# Patient Record
Sex: FEMALE | Employment: FULL TIME | URBAN - METROPOLITAN AREA
[De-identification: names, ages, dates, MRNs, and addresses within clinical notes are randomized per-mention and may not be internally consistent; named-entity substitution may affect disease eponyms.]

---

## 2020-11-20 ENCOUNTER — HOSPITAL ENCOUNTER (OUTPATIENT)
Dept: LAB | Age: 60
Discharge: HOME | End: 2020-11-20

## 2020-11-20 PROCEDURE — 88305 TISSUE EXAM BY PATHOLOGIST: CPT

## 2020-11-20 PROCEDURE — 88312 SPECIAL STAINS GROUP 1: CPT

## 2021-06-11 ENCOUNTER — HOSPITAL ENCOUNTER (OUTPATIENT)
Dept: LAB | Age: 61
Discharge: HOME | End: 2021-06-11
Payer: COMMERCIAL

## 2021-06-11 DIAGNOSIS — R53.82 CHRONIC FATIGUE: ICD-10-CM

## 2021-06-11 DIAGNOSIS — R94.31 EKG, ABNORMAL: ICD-10-CM

## 2021-06-11 DIAGNOSIS — R06.02 SOB (SHORTNESS OF BREATH): ICD-10-CM

## 2021-06-11 LAB
25(OH)D3 SERPL-MCNC: 16.1 NG/ML (ref 30–100)
ALBUMIN SERPL-MCNC: 3.9 G/DL (ref 3.2–4.6)
ALBUMIN/GLOB SERPL: 1.1 {RATIO} (ref 1.2–3.5)
ALP SERPL-CCNC: 68 U/L (ref 50–136)
ALT SERPL-CCNC: 21 U/L (ref 12–65)
ANION GAP SERPL CALC-SCNC: 6 MMOL/L (ref 7–16)
AST SERPL-CCNC: 17 U/L (ref 15–37)
BASOPHILS # BLD: 0.1 K/UL (ref 0–0.2)
BASOPHILS NFR BLD: 1 % (ref 0–2)
BILIRUB SERPL-MCNC: 0.5 MG/DL (ref 0.2–1.1)
BNP SERPL-MCNC: 93 PG/ML (ref 5–125)
BUN SERPL-MCNC: 8 MG/DL (ref 8–23)
CALCIUM SERPL-MCNC: 9 MG/DL (ref 8.3–10.4)
CHLORIDE SERPL-SCNC: 109 MMOL/L (ref 98–107)
CHOLEST SERPL-MCNC: 284 MG/DL
CO2 SERPL-SCNC: 25 MMOL/L (ref 21–32)
CREAT SERPL-MCNC: 0.64 MG/DL (ref 0.6–1)
DIFFERENTIAL METHOD BLD: NORMAL
EOSINOPHIL # BLD: 0.1 K/UL (ref 0–0.8)
EOSINOPHIL NFR BLD: 1 % (ref 0.5–7.8)
ERYTHROCYTE [DISTWIDTH] IN BLOOD BY AUTOMATED COUNT: 12.4 % (ref 11.9–14.6)
GLOBULIN SER CALC-MCNC: 3.6 G/DL (ref 2.3–3.5)
GLUCOSE SERPL-MCNC: 92 MG/DL (ref 65–100)
HCT VFR BLD AUTO: 39.7 % (ref 35.8–46.3)
HDLC SERPL-MCNC: 87 MG/DL (ref 40–60)
HDLC SERPL: 3.3 {RATIO}
HGB BLD-MCNC: 13.4 G/DL (ref 11.7–15.4)
IMM GRANULOCYTES # BLD AUTO: 0 K/UL (ref 0–0.5)
IMM GRANULOCYTES NFR BLD AUTO: 1 % (ref 0–5)
LDLC SERPL CALC-MCNC: 167.2 MG/DL
LYMPHOCYTES # BLD: 2.2 K/UL (ref 0.5–4.6)
LYMPHOCYTES NFR BLD: 37 % (ref 13–44)
MAGNESIUM SERPL-MCNC: 2.3 MG/DL (ref 1.8–2.4)
MCH RBC QN AUTO: 30.8 PG (ref 26.1–32.9)
MCHC RBC AUTO-ENTMCNC: 33.8 G/DL (ref 31.4–35)
MCV RBC AUTO: 91.3 FL (ref 79.6–97.8)
MONOCYTES # BLD: 0.5 K/UL (ref 0.1–1.3)
MONOCYTES NFR BLD: 9 % (ref 4–12)
NEUTS SEG # BLD: 3 K/UL (ref 1.7–8.2)
NEUTS SEG NFR BLD: 51 % (ref 43–78)
NRBC # BLD: 0 K/UL (ref 0–0.2)
PLATELET # BLD AUTO: 236 K/UL (ref 150–450)
PMV BLD AUTO: 9.9 FL (ref 9.4–12.3)
POTASSIUM SERPL-SCNC: 4 MMOL/L (ref 3.5–5.1)
PROT SERPL-MCNC: 7.5 G/DL (ref 6.3–8.2)
RBC # BLD AUTO: 4.35 M/UL (ref 4.05–5.2)
SODIUM SERPL-SCNC: 140 MMOL/L (ref 136–145)
TRIGL SERPL-MCNC: 149 MG/DL (ref 35–150)
TSH SERPL DL<=0.005 MIU/L-ACNC: 1.88 UIU/ML (ref 0.36–3.74)
VLDLC SERPL CALC-MCNC: 29.8 MG/DL (ref 6–23)
WBC # BLD AUTO: 5.8 K/UL (ref 4.3–11.1)

## 2021-06-11 PROCEDURE — 85025 COMPLETE CBC W/AUTO DIFF WBC: CPT

## 2021-06-11 PROCEDURE — 80053 COMPREHEN METABOLIC PANEL: CPT

## 2021-06-11 PROCEDURE — 84443 ASSAY THYROID STIM HORMONE: CPT

## 2021-06-11 PROCEDURE — 83880 ASSAY OF NATRIURETIC PEPTIDE: CPT

## 2021-06-11 PROCEDURE — 36415 COLL VENOUS BLD VENIPUNCTURE: CPT

## 2021-06-11 PROCEDURE — 83735 ASSAY OF MAGNESIUM: CPT

## 2021-06-11 PROCEDURE — 80061 LIPID PANEL: CPT

## 2021-06-11 PROCEDURE — 82306 VITAMIN D 25 HYDROXY: CPT

## 2021-06-14 NOTE — PROGRESS NOTES
Please call her,   Vit D low. Needs the once weekly high dose Vit D 50k per week for 8 weeks and then 2000 per day of the OTC Vit D3. Please let her know. Try to get 20 min sun exposure per day as well. Thanks!

## 2021-06-17 ENCOUNTER — HOSPITAL ENCOUNTER (OUTPATIENT)
Dept: CT IMAGING | Age: 61
Discharge: HOME | End: 2021-06-17
Attending: INTERNAL MEDICINE

## 2021-06-17 DIAGNOSIS — R53.82 CHRONIC FATIGUE: ICD-10-CM

## 2021-06-17 DIAGNOSIS — R94.31 EKG, ABNORMAL: ICD-10-CM

## 2021-06-17 DIAGNOSIS — R06.02 SOB (SHORTNESS OF BREATH): ICD-10-CM

## 2021-12-02 PROBLEM — E04.2 MULTINODULAR GOITER: Status: ACTIVE | Noted: 2021-12-02

## 2021-12-02 PROBLEM — E66.9 CLASS 1 OBESITY WITH SERIOUS COMORBIDITY AND BODY MASS INDEX (BMI) OF 32.0 TO 32.9 IN ADULT: Status: ACTIVE | Noted: 2021-12-02

## 2022-03-18 PROBLEM — R06.02 SOB (SHORTNESS OF BREATH): Status: ACTIVE | Noted: 2021-06-11

## 2022-03-19 PROBLEM — E66.9 CLASS 1 OBESITY WITH SERIOUS COMORBIDITY AND BODY MASS INDEX (BMI) OF 32.0 TO 32.9 IN ADULT: Status: ACTIVE | Noted: 2021-12-02

## 2022-03-19 PROBLEM — E04.2 MULTINODULAR GOITER: Status: ACTIVE | Noted: 2021-12-02

## 2022-03-19 PROBLEM — R53.82 CHRONIC FATIGUE: Status: ACTIVE | Noted: 2021-06-11

## 2022-03-19 PROBLEM — E66.811 CLASS 1 OBESITY WITH SERIOUS COMORBIDITY AND BODY MASS INDEX (BMI) OF 32.0 TO 32.9 IN ADULT: Status: ACTIVE | Noted: 2021-12-02

## 2022-03-19 PROBLEM — R94.31 EKG, ABNORMAL: Status: ACTIVE | Noted: 2021-06-11

## 2022-06-06 ENCOUNTER — OFFICE VISIT (OUTPATIENT)
Dept: ENDOCRINOLOGY | Age: 62
End: 2022-06-06
Payer: COMMERCIAL

## 2022-06-06 VITALS
SYSTOLIC BLOOD PRESSURE: 124 MMHG | HEIGHT: 63 IN | BODY MASS INDEX: 29.09 KG/M2 | HEART RATE: 77 BPM | WEIGHT: 164.2 LBS | TEMPERATURE: 98.4 F | OXYGEN SATURATION: 100 % | DIASTOLIC BLOOD PRESSURE: 78 MMHG

## 2022-06-06 DIAGNOSIS — E04.2 MULTINODULAR GOITER: Primary | ICD-10-CM

## 2022-06-06 PROCEDURE — 99213 OFFICE O/P EST LOW 20 MIN: CPT | Performed by: INTERNAL MEDICINE

## 2022-06-06 PROCEDURE — 76536 US EXAM OF HEAD AND NECK: CPT | Performed by: INTERNAL MEDICINE

## 2022-06-06 ASSESSMENT — ENCOUNTER SYMPTOMS
DIARRHEA: 0
CONSTIPATION: 0

## 2022-06-06 NOTE — PROGRESS NOTES
Zackery Olvera MD, [facility]  [address], [id number] [facility], [address] [place] [address]  Phone [phone]  Facsimile [phone]          Javier Pina is a 57 y.o. female seen 6/6/2022 for follow up of multinodular goiter        ASSESSMENT AND PLAN:    1. Multinodular goiter  Thyroid ultrasound performed today revealed that the nodules were stable in size. None of the nodules currently meet ACR TI-RADS criteria for FNA biopsy. I will have her return in 1 year for a follow-up ultrasound to document stability. She has no compressive symptoms at this point which would warrant referral for thyroidectomy.                 Procedures:    Thyroid ultrasound 6/6/2022: Right lobe 1.25 x 1.52 x 4.34 cm, homogeneous echotexture. In the mid right lobe medially there is a hypoechoic nodule 0.22 x 0.28 x 0.31 cm. In the mid to inferior right lobe there is a mostly solid isoechoic nodule measuring 0.76 x 0.85 x 1.39 cm without microcalcifications (TR 3). Isthmus measures 0.23 cm. Left lobe 1.31 x 1.69 x 4.62 cm, homogeneous echotexture. In the mid to superior left lobe laterally there is a complex, mostly solid isoechoic nodule measuring 0.37 x 0.40 x 0.47 cm (TR 3). In the mid to inferior left lobe anteriorly-medially there is a complex, mostly solid isoechoic nodule measuring 0.53 x 0.74 x 1.07 cm (TR 3). Just inferiorly there is a complex, mostly solid isoechoic nodule measuring 0.47 x 0.60 x 0.80 cm (TR 3). In the inferior left lobe posteriorly there is a solid hypoechoic nodule measuring 0.70 x 0.81 x 1.18 cm without microcalcifications (TR 4). Just inferiorly is a complex nodule measuring 0.24 x 0.47 x 0.54 cm (TR 2). Follow-up and Dispositions    · Return in about 1 year (around 6/6/2023).          HISTORY OF PRESENT ILLNESS:    THYROID NODULE / MULTINODULAR GOITER     Presentation: Thyromegaly noted on physical examination by primary care physician.     Thyroid Cancer Risk Factors: There is no family history of thyroid cancer. There is no history of radiation to the head/neck.      Symptoms:  Denies anterior neck enlargement/pain/pressure. Denies dysphagia, positional shortness of breath, hoarseness.     Imaging:  Thyroid ultrasound 11/24/2021 [facility]): Right lobe 1.5 x 4.6 x 1.5 cm. In the inferior right lobe there is a solid hypoechoic nodule measuring 1.5 x 0.6 x 1.1 cm (TR 4). Left lobe 1.8 x 5.4 x 1.3 cm. In the inferior left lobe there is a mixed cystic and solid nodule measuring 1.0 x 0.5 x 0.8 cm with subtle hypoechoic component (TR 3). There is a solid hypoechoic nodule in the inferior left lobe measuring 1.2 x 0.7 x 1.2 cm (TR 4). Limited thyroid ultrasound 12/2/2021: In the mid to inferior right lobe there is a complex, predominantly solid isoechoic nodule measuring 0.78 x 0.94 x 1.43 cm without microcalcifications (TR 3). In the mid to inferior left lobe there is a complex predominantly solid isoechoic nodule measuring 0.58 x 0.78 x 0.98 cm (TR 3). In the inferior left lobe laterally there is a complex isoechoic nodule measuring 0.50 x 0.65 x 0.80 cm (TR 2). Just superiorly is a complex, predominantly cystic nodule measuring 0.38 x 0.48 cm (TR 1). In the inferior left lobe posteriorly there is a predominantly solid hypoechoic nodule measuring 0.72 x 0.93 x 1.25 cm without microcalcifications (TR 4). Just inferiorly is a complex nodule measuring 0.27 x 0.45 cm (TR 2). Thyroid ultrasound 6/6/2022: Right lobe 1.25 x 1.52 x 4.34 cm, homogeneous echotexture. In the mid right lobe medially there is a hypoechoic nodule 0.22 x 0.28 x 0.31 cm. In the mid to inferior right lobe there is a mostly solid isoechoic nodule measuring 0.76 x 0.85 x 1.39 cm without microcalcifications (TR 3). Isthmus measures 0.23 cm. Left lobe 1.31 x 1.69 x 4.62 cm, homogeneous echotexture.   In the mid to superior left lobe laterally there is a complex, mostly solid isoechoic nodule measuring 0.37 x 0.40 x 0.47 cm (TR 3). In the mid to inferior left lobe anteriorly-medially there is a complex, mostly solid isoechoic nodule measuring 0.53 x 0.74 x 1.07 cm (TR 3). Just inferiorly there is a complex, mostly solid isoechoic nodule measuring 0.47 x 0.60 x 0.80 cm (TR 3). In the inferior left lobe posteriorly there is a solid hypoechoic nodule measuring 0.70 x 0.81 x 1.18 cm without microcalcifications (TR 4). Just inferiorly is a complex nodule measuring 0.24 x 0.47 x 0.54 cm (TR 2).     Labs:  11/5/2021: TSH 1.29. Review of Systems   Constitutional: Negative for diaphoresis and fatigue. Weight decreased 21 pounds since last visit. Cardiovascular: Negative for palpitations. Gastrointestinal: Negative for constipation and diarrhea. Endocrine: Negative for cold intolerance and heat intolerance. Neurological: Negative for tremors. Vital Signs:  /78   Pulse 77   Temp 98.4 °F (36.9 °C) (Tympanic)   Ht 5' 3\" (1.6 m)   Wt 164 lb 3.2 oz (74.5 kg)   SpO2 100%   BMI 29.09 kg/m²     Wt Readings from Last 3 Encounters:   06/06/22 164 lb 3.2 oz (74.5 kg)   12/02/21 185 lb (83.9 kg)   07/13/21 187 lb 9.6 oz (85.1 kg)       Physical Exam  Constitutional:       General: She is not in acute distress. Neck:      Comments: Thyroid palpable and nodular. Cardiovascular:      Rate and Rhythm: Normal rate and regular rhythm. Lymphadenopathy:      Cervical: No cervical adenopathy. Neurological:      Motor: No tremor.          Orders Placed This Encounter   Procedures    US, HEAD/NECK TISSUES,REAL TIME       Current Outpatient Medications   Medication Sig Dispense Refill    ergocalciferol (ERGOCALCIFEROL) 1.25 MG (05123 UT) capsule Take 50,000 Units by mouth every 7 days      omeprazole (PRILOSEC) 40 MG delayed release capsule TAKE ONE CAPSULE 30 MINUTES BEFORE MORNING MEAL TWO TIMES A DAY FOR 30 DAYS       No current facility-administered medications for this visit.

## 2023-07-18 ENCOUNTER — TRANSCRIBE ORDERS (OUTPATIENT)
Dept: SCHEDULING | Age: 63
End: 2023-07-18

## 2023-07-18 DIAGNOSIS — R10.31 RLQ ABDOMINAL PAIN: Primary | ICD-10-CM

## 2023-07-25 ENCOUNTER — HOSPITAL ENCOUNTER (OUTPATIENT)
Dept: CT IMAGING | Age: 63
Discharge: HOME | End: 2023-07-28
Payer: COMMERCIAL

## 2023-07-25 DIAGNOSIS — R10.31 RLQ ABDOMINAL PAIN: ICD-10-CM

## 2023-07-25 LAB — CREAT BLD-MCNC: 0.59 MG/DL (ref 0.8–1.5)

## 2023-07-25 PROCEDURE — 82565 ASSAY OF CREATININE: CPT

## 2023-07-25 PROCEDURE — 6360000004 HC RX CONTRAST MEDICATION: Performed by: INTERNAL MEDICINE

## 2023-07-25 PROCEDURE — 74177 CT ABD & PELVIS W/CONTRAST: CPT

## 2023-07-25 RX ADMIN — DIATRIZOATE MEGLUMINE AND DIATRIZOATE SODIUM 15 ML: 660; 100 LIQUID ORAL; RECTAL at 10:55

## 2023-07-25 RX ADMIN — IOPAMIDOL 100 ML: 755 INJECTION, SOLUTION INTRAVENOUS at 10:57

## 2024-10-17 ENCOUNTER — OFFICE VISIT (OUTPATIENT)
Dept: ENDOCRINOLOGY | Age: 64
End: 2024-10-17
Payer: COMMERCIAL

## 2024-10-17 VITALS
OXYGEN SATURATION: 99 % | BODY MASS INDEX: 30.93 KG/M2 | DIASTOLIC BLOOD PRESSURE: 74 MMHG | WEIGHT: 174.6 LBS | HEART RATE: 76 BPM | SYSTOLIC BLOOD PRESSURE: 118 MMHG

## 2024-10-17 DIAGNOSIS — E04.2 MULTINODULAR GOITER: Primary | ICD-10-CM

## 2024-10-17 PROCEDURE — 99213 OFFICE O/P EST LOW 20 MIN: CPT | Performed by: INTERNAL MEDICINE

## 2024-10-17 PROCEDURE — 1123F ACP DISCUSS/DSCN MKR DOCD: CPT | Performed by: INTERNAL MEDICINE

## 2024-10-17 PROCEDURE — 76536 US EXAM OF HEAD AND NECK: CPT | Performed by: INTERNAL MEDICINE

## 2024-10-17 ASSESSMENT — ENCOUNTER SYMPTOMS
CONSTIPATION: 0
DIARRHEA: 0

## 2024-10-17 NOTE — PROGRESS NOTES
microcalcifications (TR 3).  Isthmus measures 0.23 cm.  Left lobe 1.31 x 1.69 x 4.62 cm, homogeneous echotexture.  In the mid to superior left lobe laterally there is a complex, mostly solid isoechoic nodule measuring 0.37 x 0.40 x 0.47 cm (TR 3).  In the mid to inferior left lobe anteriorly-medially there is a complex, mostly solid isoechoic nodule measuring 0.53 x 0.74 x 1.07 cm (TR 3).  Just inferiorly there is a complex, mostly solid isoechoic nodule measuring 0.47 x 0.60 x 0.80 cm (TR 3).  In the inferior left lobe posteriorly there is a solid hypoechoic nodule measuring 0.70 x 0.81 x 1.18 cm without microcalcifications (TR 4).  Just inferiorly is a complex nodule measuring 0.24 x 0.47 x 0.54 cm (TR 2).  Thyroid ultrasound 10/17/2024: Right lobe 1.51 x 1.72 x 5.14 cm, homogeneous echotexture.  In the mid right lobe medially there is a hypoechoic nodule 0.21 x 0.35 x 0.36 cm (TR 4).  In the mid to inferior right lobe there is a mostly solid isoechoic nodule measuring 0.76 x 0.88 x 1.37 cm without microcalcifications (TR 3).  Isthmus measures 0.32 cm.  Left lobe 1.53 x 1.80 x 5.50 cm, homogeneous echotexture.  In the mid to inferior left lobe anteriorly-medially there is a complex, mostly solid isoechoic nodule measuring 0.55 x 1.06 x 1.24 cm (TR 3).  In the mid left lobe laterally there is a complex isoechoic nodule measuring 0.35 x 0.51 x 0.57 cm (TR 2).  In the mid to inferior left lobe posteriorly there is a complex isoechoic nodule measuring 0.45 x 0.64 x 0.71 cm (TR 2).  In the inferior left lobe posteriorly there is a solid hypoechoic nodule measuring 0.69 x 0.87 x 1.13 cm without microcalcifications (TR 4).  Just inferiorly is a complex nodule measuring 0.23 x 0.43 x 0.54 cm (TR 2).     Labs:  11/5/2021: TSH 1.29.      Review of Systems   Constitutional:  Negative for diaphoresis and fatigue.        Weight increased 10 pounds since last visit.   Cardiovascular:  Negative for palpitations.